# Patient Record
Sex: FEMALE | Race: WHITE | NOT HISPANIC OR LATINO | URBAN - METROPOLITAN AREA
[De-identification: names, ages, dates, MRNs, and addresses within clinical notes are randomized per-mention and may not be internally consistent; named-entity substitution may affect disease eponyms.]

---

## 2018-08-01 PROBLEM — 87522002 IRON DEFICIENCY ANEMIA: Status: ACTIVE | Noted: 2018-08-01

## 2018-08-01 PROBLEM — 238136002 MORBID OBESITY: Status: ACTIVE | Noted: 2018-08-01

## 2018-08-01 PROBLEM — 40930008 HYPOTHYROIDISM: Status: ACTIVE | Noted: 2018-08-01

## 2022-04-30 ENCOUNTER — TELEPHONE ENCOUNTER (OUTPATIENT)
Dept: URBAN - METROPOLITAN AREA CLINIC 121 | Facility: CLINIC | Age: 82
End: 2022-04-30

## 2022-05-01 ENCOUNTER — TELEPHONE ENCOUNTER (OUTPATIENT)
Dept: URBAN - METROPOLITAN AREA CLINIC 121 | Facility: CLINIC | Age: 82
End: 2022-05-01

## 2022-05-01 RX ORDER — NALTREXONE HYDROCHLORIDE 50 MG/1
TABLET, FILM COATED ORAL
Status: ACTIVE | COMMUNITY
Start: 2018-08-01

## 2022-05-01 RX ORDER — THYROID, PORCINE 90 MG/1
TABLET ORAL
Status: ACTIVE | COMMUNITY
Start: 2018-08-01

## 2022-05-01 RX ORDER — APIXABAN 5 MG/1
TABLET, FILM COATED ORAL
Status: ACTIVE | COMMUNITY
Start: 2018-08-01

## 2022-05-01 RX ORDER — METOPROLOL SUCCINATE 25 MG/1
TABLET, EXTENDED RELEASE ORAL
Status: ACTIVE | COMMUNITY
Start: 2018-08-01

## 2022-05-01 RX ORDER — ERYTHROMYCIN 5 MG/G
OINTMENT OPHTHALMIC
Status: ACTIVE | COMMUNITY
Start: 2018-08-01

## 2022-05-01 RX ORDER — LOSARTAN POTASSIUM 50 MG/1
TABLET, FILM COATED ORAL
Status: ACTIVE | COMMUNITY
Start: 2018-08-01

## 2022-05-01 RX ORDER — POLYETHYLENE GLYCOL 400 AND PROPYLENE GLYCOL 4; 3 MG/ML; MG/ML
SOLUTION/ DROPS OPHTHALMIC
Status: ACTIVE | COMMUNITY
Start: 2018-08-01

## 2022-05-01 RX ORDER — FERROUS FUMARATE AND POLYSACCHRIDE IRON COMPLEX AND FOLIC ACID 191.2; 135.9; 1; 40; 3 MG/1; MG/1; MG/1; MG/1; MG/1
TAKE ONE CAPSULE BY MOUTH ONCE DAILY CAPSULE ORAL
Status: ACTIVE | COMMUNITY
Start: 2018-08-14

## 2022-05-01 RX ORDER — CYCLOSPORINE 0.5 MG/ML
EMULSION OPHTHALMIC
Status: ACTIVE | COMMUNITY
Start: 2018-08-01

## 2024-02-09 ENCOUNTER — OV NP (OUTPATIENT)
Dept: URBAN - METROPOLITAN AREA CLINIC 27 | Facility: CLINIC | Age: 84
End: 2024-02-09
Payer: MEDICARE

## 2024-02-09 VITALS — WEIGHT: 210 LBS | HEIGHT: 64 IN | BODY MASS INDEX: 35.85 KG/M2

## 2024-02-09 DIAGNOSIS — R10.30 LOWER ABDOMINAL PAIN: ICD-10-CM

## 2024-02-09 PROCEDURE — 99244 OFF/OP CNSLTJ NEW/EST MOD 40: CPT | Performed by: PHYSICIAN ASSISTANT

## 2024-02-09 PROCEDURE — 99204 OFFICE O/P NEW MOD 45 MIN: CPT | Performed by: PHYSICIAN ASSISTANT

## 2024-02-09 RX ORDER — METOPROLOL SUCCINATE 25 MG/1
TABLET, EXTENDED RELEASE ORAL
Status: ACTIVE | COMMUNITY
Start: 2018-08-01

## 2024-02-09 RX ORDER — APIXABAN 5 MG/1
TABLET, FILM COATED ORAL
Status: ACTIVE | COMMUNITY
Start: 2018-08-01

## 2024-02-09 RX ORDER — HYOSCYAMINE SULFATE 0.12 MG/1
1 TABLET AS NEEDED TABLET ORAL
Qty: 180 TABLET | Refills: 0 | OUTPATIENT
Start: 2024-02-09 | End: 2024-03-10

## 2024-02-09 RX ORDER — CYCLOSPORINE 0.5 MG/ML
EMULSION OPHTHALMIC
Status: DISCONTINUED | COMMUNITY
Start: 2018-08-01

## 2024-02-09 RX ORDER — POLYETHYLENE GLYCOL 400 AND PROPYLENE GLYCOL 4; 3 MG/ML; MG/ML
SOLUTION/ DROPS OPHTHALMIC
Status: DISCONTINUED | COMMUNITY
Start: 2018-08-01

## 2024-02-09 RX ORDER — ERYTHROMYCIN 5 MG/G
OINTMENT OPHTHALMIC
Status: DISCONTINUED | COMMUNITY
Start: 2018-08-01

## 2024-02-09 RX ORDER — FERROUS FUMARATE AND POLYSACCHRIDE IRON COMPLEX AND FOLIC ACID 191.2; 135.9; 1; 40; 3 MG/1; MG/1; MG/1; MG/1; MG/1
TAKE ONE CAPSULE BY MOUTH ONCE DAILY CAPSULE ORAL
Status: DISCONTINUED | COMMUNITY
Start: 2018-08-14

## 2024-02-09 RX ORDER — THYROID, PORCINE 90 MG/1
TABLET ORAL
Status: ACTIVE | COMMUNITY
Start: 2018-08-01

## 2024-02-09 RX ORDER — NALTREXONE HYDROCHLORIDE 50 MG/1
TABLET, FILM COATED ORAL
Status: DISCONTINUED | COMMUNITY
Start: 2018-08-01

## 2024-02-09 RX ORDER — LOSARTAN POTASSIUM 50 MG/1
TABLET, FILM COATED ORAL
Status: ACTIVE | COMMUNITY
Start: 2018-08-01

## 2024-02-09 NOTE — PHYSICAL EXAM CONSTITUTIONAL:
well developed, well nourished , in no acute distress , in a wheelchair. normal communication ability

## 2024-02-09 NOTE — HPI-TODAY'S VISIT:
Ms. Brown is an 83-year-old female seen at the request of  Dr. Bowser for lower abdominal pain. A copy of this document will be sent to the referring physician. She presents with constant diffuse lower aching abd pain that radiates to the left side of her back for the past 1-2 months. The pain is not affected by eating, defecation nor does the pain disturb her sleep. She hasn't tried medication for her pain. No prior imaging studies. She denies nausea, vomiting, dysphagia, odynophagia, diarrhea, constipation, melena or hematochezia. Occasional heartburn. She has kidney stones in her left kidney, per the patient. She does not know when her last colonoscopy was.

## 2024-02-09 NOTE — PHYSICAL EXAM GASTROINTESTINAL
Exam limited due to the patient being in a wheel chair and her body habitus. Abdomen , soft, diffuse lower abdominal tenderness. nontender, nondistended , no guarding or rigidity , no masses palpable , normal bowel sounds ,

## 2024-08-06 ENCOUNTER — APPOINTMENT (RX ONLY)
Dept: URBAN - METROPOLITAN AREA OTHER 5 | Facility: OTHER | Age: 84
Setting detail: DERMATOLOGY
End: 2024-08-06

## 2024-08-06 PROBLEM — D48.5 NEOPLASM OF UNCERTAIN BEHAVIOR OF SKIN: Status: ACTIVE | Noted: 2024-08-06

## 2024-08-06 PROCEDURE — ? CURETTAGE AND DESTRUCTION WITH PATHOLOGY

## 2024-08-06 PROCEDURE — ? COUNSELING

## 2024-08-06 PROCEDURE — 17262 DSTRJ MAL LES T/A/L 1.1-2.0: CPT

## 2024-08-06 NOTE — PROCEDURE: CURETTAGE AND DESTRUCTION WITH PATHOLOGY
Detail Level: Detailed
Size Of Lesion In Cm: 1.3
Size Of Lesion After Curettage: 1.6
Anesthesia Type: 1% lidocaine with epinephrine
Anesthesia Volume In Cc: 3
Cautery Type: electrodesiccation
What Was Performed First?: Curettage
Additional Information: (Optional): The wound was cleaned, and a pressure dressing was applied.  The patient received detailed post-op instructions.
Lab: [id number]
Lab Facility: [id number]
Histology Text: Following the procedure a portion of the curetted material was sent for histologic evaluation.
Biopsy Type: H and E
Render Path Notes In Note?: No
Consent was obtained from the patient. The risks, benefits and alternatives to therapy were discussed in detail. Specifically, the risks of infection, scarring, bleeding, prolonged wound healing, nerve injury, incomplete removal, allergy to anesthesia and recurrence were addressed. Alternatives to ED&C, such as: surgical removal and XRT were also discussed.  Prior to the procedure, the treatment site was clearly identified and confirmed by the patient. All components of Universal Protocol/PAUSE Rule completed.
Post-Care Instructions: I reviewed with the patient in detail post-care instructions. Patient is to keep the area dry for 48 hours, and not to engage in any swimming until the area is healed. Should the patient develop any fevers, chills, bleeding, severe pain patient will contact the office immediately.
Billing Type: Third-Party Bill
Bill As A Line Item Or As Units: Line Item

## 2024-10-08 ENCOUNTER — TELEPHONE ENCOUNTER (OUTPATIENT)
Dept: URBAN - METROPOLITAN AREA CLINIC 27 | Facility: CLINIC | Age: 84
End: 2024-10-08

## 2024-10-08 RX ORDER — HYOSCYAMINE SULFATE 0.12 MG/1
1 TABLET AS NEEDED TABLET ORAL
Qty: 180 TABLET | Refills: 0
Start: 2024-02-09 | End: 2024-11-09